# Patient Record
Sex: FEMALE | Race: WHITE
[De-identification: names, ages, dates, MRNs, and addresses within clinical notes are randomized per-mention and may not be internally consistent; named-entity substitution may affect disease eponyms.]

---

## 2017-01-09 ENCOUNTER — HOSPITAL ENCOUNTER (OUTPATIENT)
Dept: HOSPITAL 47 - RADMAMWWP | Age: 72
Discharge: HOME | End: 2017-01-09
Payer: MEDICARE

## 2017-01-09 DIAGNOSIS — Z12.31: Primary | ICD-10-CM

## 2017-01-09 PROCEDURE — 77063 BREAST TOMOSYNTHESIS BI: CPT

## 2017-01-09 PROCEDURE — 77052: CPT

## 2017-01-10 NOTE — MM
Reason for exam: screening  (asymptomatic).

Last mammogram was performed 1 year and 10 months ago.



History:

Patient is postmenopausal.

Benign US breast aspiration single LT of the left breast, March 16, 2015.

Took estrogen for 15 years.



Physical Findings:

A clinical breast exam by your physician is recommended on an annual basis and 

results should be correlated with mammographic findings.



MG 3D Screening Mammo W/Cad

Bilateral CC and MLO view(s) were taken.

Prior study comparison: March 16, 2015, left breast MG diagnostic mammo LT wo CAD.

December 17, 2014, bilateral MG diagnostic mammo w CAD SARAH.

The breast tissue is heterogeneously dense. This may lower the sensitivity of 

mammography.  No significant changes when compared with prior studies.





ASSESSMENT: Negative, BI-RAD 1



RECOMMENDATION:

Routine screening mammogram of both breasts in 1 year.

## 2019-10-15 ENCOUNTER — HOSPITAL ENCOUNTER (OUTPATIENT)
Dept: HOSPITAL 47 - RADCTMAIN | Age: 74
Discharge: HOME | End: 2019-10-15
Attending: ORTHOPAEDIC SURGERY
Payer: MEDICARE

## 2019-10-15 DIAGNOSIS — S52.571A: Primary | ICD-10-CM

## 2019-10-15 NOTE — CT
EXAMINATION TYPE: CT wrist RT wo con

 

DATE OF EXAM: 10/15/2019

 

COMPARISON: No outside images or priors are available for comparison.

 

HISTORY: Known displaced fracture of the right ulna

 

CT DLP: 263 mGycm

Automated exposure control for dose reduction was used. Standard unenhanced CT of the right wrist was
 performed in the axial plane with sagittal and coronal reconstructions. Three-D reconstructed images
 of the osseous structures with graded at a separate workstation for review.

 

FINDINGS: 

There is an apex volar angulated, comminuted, intra-articular, impacted fracture of the distal right 
radius with up to 5 mm foreshortening and up to 1.3 cm dorsal displacement of the distal fracture fra
gment. Intra-articular extension is seen into the radiocarpal joint at multiple locations given the c
omminution. There is extensive overlying soft tissue swelling and punctate foci of subcutaneous emphy
sema are also seen. There is a transversely oriented fracture of the ulnar styloid process with 1 mm 
of diastases of the fracture. There is no widening of the scapholunate joint. Carpal carpal interspac
es are maintained. No acute fracture seen of the carpal bones. No acute fracture of the proximal meta
carpals. Mild degenerative changes of the first metacarpal phalangeal joint displayed as joint space 
narrowing, opposing surface sclerosis and osseous proliferation. Evaluation of the ligaments and tend
ons are limited on CT and further limited given lack of intravenous contrast as well as further limit
ed given soft tissue swelling.

 

IMPRESSION: 

1. ACUTE, COMMINUTED, IMPACTED, APEX VOLAR ANGULATED, MULTIFOCAL INTRA-ARTICULAR FRACTURE OF THE DIST
AL RIGHT RADIUS WITH MINIMAL FORESHORTENING. PUNCTATE FOCI OF SUBCUTANEOUS EMPHYSEMA ARE ALSO SEEN CO
ULD BE INTRODUCED BY LACERATION, INTRAVENOUS INJECTION OR MUCH LESS LIKELY INFECTION.

2. ACUTE, TRANSVERSELY ORIENTED, MINIMALLY DIASTATIC FRACTURE OF THE ULNAR STYLOID PROCESS.

3. DIFFUSE SOFT TISSUE SWELLING OF THE RIGHT WRIST.

## 2019-10-22 ENCOUNTER — HOSPITAL ENCOUNTER (OUTPATIENT)
Dept: HOSPITAL 47 - LABPAT | Age: 74
Discharge: HOME | End: 2019-10-22
Attending: ORTHOPAEDIC SURGERY
Payer: MEDICARE

## 2019-10-22 DIAGNOSIS — Z01.818: Primary | ICD-10-CM

## 2019-10-22 PROCEDURE — 93005 ELECTROCARDIOGRAM TRACING: CPT

## 2019-10-23 ENCOUNTER — HOSPITAL ENCOUNTER (OUTPATIENT)
Dept: HOSPITAL 47 - OR | Age: 74
Discharge: HOME | End: 2019-10-23
Attending: ORTHOPAEDIC SURGERY
Payer: MEDICARE

## 2019-10-23 VITALS — TEMPERATURE: 98.8 F

## 2019-10-23 VITALS — SYSTOLIC BLOOD PRESSURE: 165 MMHG | HEART RATE: 75 BPM | DIASTOLIC BLOOD PRESSURE: 85 MMHG

## 2019-10-23 VITALS — RESPIRATION RATE: 16 BRPM

## 2019-10-23 VITALS — BODY MASS INDEX: 28.8 KG/M2

## 2019-10-23 DIAGNOSIS — S52.571A: Primary | ICD-10-CM

## 2019-10-23 DIAGNOSIS — Z79.899: ICD-10-CM

## 2019-10-23 DIAGNOSIS — Z79.84: ICD-10-CM

## 2019-10-23 DIAGNOSIS — I10: ICD-10-CM

## 2019-10-23 DIAGNOSIS — H91.90: ICD-10-CM

## 2019-10-23 DIAGNOSIS — Z87.891: ICD-10-CM

## 2019-10-23 DIAGNOSIS — Z97.3: ICD-10-CM

## 2019-10-23 DIAGNOSIS — Z97.2: ICD-10-CM
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
## 2019-10-23 DIAGNOSIS — Z88.5: ICD-10-CM

## 2019-10-23 DIAGNOSIS — Z88.8: ICD-10-CM

## 2019-10-23 DIAGNOSIS — M19.90: ICD-10-CM

## 2019-10-23 DIAGNOSIS — E11.9: ICD-10-CM

## 2019-10-23 DIAGNOSIS — Z88.1: ICD-10-CM

## 2019-10-23 DIAGNOSIS — K21.9: ICD-10-CM

## 2019-10-23 DIAGNOSIS — Z90.710: ICD-10-CM

## 2019-10-23 DIAGNOSIS — Z79.891: ICD-10-CM

## 2019-10-23 DIAGNOSIS — Z83.3: ICD-10-CM

## 2019-10-23 DIAGNOSIS — J45.909: ICD-10-CM

## 2019-10-23 DIAGNOSIS — Z82.49: ICD-10-CM

## 2019-10-23 DIAGNOSIS — G25.81: ICD-10-CM

## 2019-10-23 LAB
GLUCOSE BLD-MCNC: 113 MG/DL (ref 75–99)
GLUCOSE BLD-MCNC: 171 MG/DL (ref 75–99)

## 2019-10-23 PROCEDURE — 64413: CPT

## 2019-10-23 PROCEDURE — 73100 X-RAY EXAM OF WRIST: CPT

## 2019-10-23 PROCEDURE — 76942 ECHO GUIDE FOR BIOPSY: CPT

## 2019-10-23 PROCEDURE — 25609 OPTX DST RD XART FX/EP SEP3+: CPT

## 2019-10-23 PROCEDURE — 64415 NJX AA&/STRD BRCH PLXS IMG: CPT

## 2019-10-23 NOTE — P.ANPRN
Procedure Note - Anesthesia





- Nerve Block Performed


  ** Right Supraclavicular Single


Time Out Performed: Yes


Date of Procedure: 10/23/19


Procedure Start Time: 14:01


Procedure Stop Time: 14:08


Location of Patient Procedure: PreOp


Indication: Acute Post-Operative Pain, Requested by Surgeon


Sedation Type: Sedate with meaningful contact maintained


Preparation: Sterile Prep


Position: Supine


Needle Types: Pajunk


Needle Gauge: 21


Ultrasound used to visualize needle placement: Yes


Ultrasound used to observe medication spread: Yes


Blood Aspirated: No


Pain Paresthesia on Injection Noted: No


Resistance on Injection: Normal


Image Stored and Saved: Yes


Events: Uneventful and Well Tolerated (ropi .5% 25cc plus dexamethasone 4mg)

## 2019-10-24 NOTE — XR
Fluoroscopy

 

History: Fracture

 

Fracture. Hardware placement. RT wrist. 6 IM scanned. 70.4 sec FL.

## 2019-10-26 NOTE — P.OP
Date of Procedure: 10/23/19


Preoperative Diagnosis: 


Displaced, comminuted intra-articular right distal radius fracture





Postoperative Diagnosis: 


Displaced, comminuted intra-articular right distal radius fracture





Procedure(s) Performed: 


Open reduction and internal fixation of comminuted, intra-articular right distal

radius fracture (greater than 3 parts)





Implants: 


Arthrex dorsal spanning distal radius plate and cortical screws


Anesthesia: GETA, regional, local


Surgeon: Curt Gomez


Estimated Blood Loss (ml): 5


Condition: stable


Disposition: PACU


Indications for Procedure: 


The patient is a very pleasant 74-year-old female who sustained a complex, 

comminuted intra-articular right distal radius fracture after mechanical fall.  

A CT scan was performed which showed substantial comminution with very little 

subchondral bone to support direct fragment fixation. Treatment options (and 

associated risks and benefits) were discussed in the office. Surgical treatment 

was recommended, specifically in the form of bridge plating.  We discussed in 

detail the risk of of tendon injury, adhesions and loss of motion as well as the

need for a secondary surgery for hardware removal and possible lysis of 

adhesions.  She expressed understanding and willingness to accept these risks 

and wished to proceed with surgery.  In preop, the patient denied any additional

questions or concerns. Consent forms were signed. The operative site was 

confirmed and marked.





Description of Procedure: 


The patient was administered a regional nerve block by the anesthesia team and 

then brought to the operating suite. The patient was positioned supine with the 

operative limb on an arm board.  All bony prominences were well-padded.  General

anesthesia was administered uneventfully. Prophylactic IV antibiotics were 

administered. A tourniquet was placed on the right arm, which was then prepped 

and draped in standard, sterile fashion.  A timeout was performed, confirming 

patient identifiers, the operative side, the site and the procedure to be 

performed. All team members expressed agreement.





The fracture was initially evaluated with intraoperative fluoroscopy.  Multiple 

fracture fragments were again noted with fracture lines extending into the DRUJ 

and radiocarpal joints.  Manual reduction maneuver was performed utilizing 

combination of axial traction, ulnar deviation and palmar translation.  Good 

initial alignment was obtained.  The amount of subchondral bone still felt to be

inadequate to permit direct fragment fixation and the decision was made to 

proceed with dorsal spanning fixation.





The plate was positioned on the skin and used as a template to curt the proximal

and distal incision sites.  The limb was exsanguinated with an Esmarch and the 

tourniquet was inflated.  A longitudinal incision was made between the second 

and third metacarpal shafts.  Blunt spreading dissection was used.  Small 

superficial sensory branches were identified, mobilized and protected.  The 

extensor tendons were mobilized to expose the dorsal surface of the second 

metacarpal shaft.





A second longitudinal incision was made on the dorsal aspect of the distal 

forearm.  Spreading dissection was utilized.  A branch of the superficial radial

sensory nerve was identified, mobilized and protected.  The interval between the

brachioradialis and extensor carpi radialis longus tendon was developed to 

expose the radial shaft.  





A Fargo elevator was inserted through the distal wound and passed deep to the 

extensor tendons, creating a path for the plate.  The dorsal comminution and 

some residual extension deformity made passing this across the fracture site 

difficult.  The reduction maneuver was repeated.  With the fracture held 

reduced, a 0.062 K wire was inserted percutaneously into the radial styloid and 

driven across the fracture site.  Wire position and fracture reduction were 

confirmed on imaging.  The Fargo was again inserted and passed easily over the 

dorsal capsule and across the fracture site to the dorsal radial shaft.





The plate was inserted distally and passed in a similar fashion.  Passive 

flexion and extension of the thumb and fingers showed no tethering or 

restriction of tendon gliding.  The plate was clamped to the radius proximally. 

Distally, the plate was placed over the second metacarpal shaft.  Position was 

confirmed on imaging.  A cortical screw was drilled, measured and inserted to 

secure the plate to the metacarpal.  Some residual extension deformity remained.

 The percutaneous K wire was backed out and the fracture was re-reduced, 

improving both radial inclination and volar tilt.  The wire was readvanced for 

provisional fixation.





Plate position and fracture reduction were again confirmed on imaging.  Three 

cortical screws were drilled, measured and inserted to secure the plate to the 

radial shaft.  Two additional screws were drilled, measured and inserted to 

further secure the plate to the metacarpal. Final x-rays were obtained which 

revealed satisfactory reduction of the fracture. The wrist was then stressed 

under live fluoroscopy - the fixation construct showed excellent stability with 

no appreciable motion of the fracture fragments.





The tourniquet was released after 62 minutes at 250 mmHg. Good hemostasis was 

obtained with held pressure.  The wound was thoroughly irrigated with normal 

saline.  The periosteum was repaired over the plate proximally with interrupted 

Vicryl sutures. The subcutaneous tissues of the proximal incision were 

reapproximated with interrupted 3-0 Vicryl sutures. The incision was closed with

a running 4-0 nylon suture.  The incision in the hand was closed with a running 

5-0 nylon suture. Local anesthetic with epinephrine was injected into the 

perioperative subcutaneous tissues for adjunctive postoperative pain control and

hemostasis. A soft, sterile dressing was applied. All sponge, needle and instr

ument counts were correct at the end of the case. The patient tolerated the 

procedure well and was taken to the recovery room in stable condition.

## 2019-12-09 ENCOUNTER — HOSPITAL ENCOUNTER (OUTPATIENT)
Dept: HOSPITAL 47 - RADMAMWWP | Age: 74
Discharge: HOME | End: 2019-12-09
Attending: INTERNAL MEDICINE
Payer: MEDICARE

## 2019-12-09 DIAGNOSIS — Z12.31: Primary | ICD-10-CM

## 2019-12-09 PROCEDURE — 77063 BREAST TOMOSYNTHESIS BI: CPT

## 2019-12-09 PROCEDURE — 77067 SCR MAMMO BI INCL CAD: CPT

## 2019-12-10 NOTE — MM
Reason for exam: screening  (asymptomatic).

Last mammogram was performed 2 years and 11 months ago.



History:

Patient is postmenopausal.

Benign US breast aspiration single LT of the left breast, March 16, 2015.

Took estrogen for 15 years.



Physical Findings:

A clinical breast exam by your physician is recommended on an annual basis and 

results should be correlated with mammographic findings.



MG 3D Screening Mammo W/Cad

Bilateral CC and MLO view(s) were taken.

Prior study comparison: January 9, 2017, bilateral MG 3d screening mammo w/cad.  

March 16, 2015, left breast MG diagnostic mammo LT wo CAD.

The breast tissue is heterogeneously dense. This may lower the sensitivity of 

mammography.  Benign appearing bilateral calcifications. Previous mammotome biopsy

in the left breast.  No significant changes when compared with prior studies.





ASSESSMENT: Benign, BI-RAD 2



RECOMMENDATION:

Routine screening mammogram of both breasts in 1 year.

## 2019-12-12 ENCOUNTER — HOSPITAL ENCOUNTER (OUTPATIENT)
Dept: HOSPITAL 47 - RADCTMAIN | Age: 74
Discharge: HOME | End: 2019-12-12
Attending: ORTHOPAEDIC SURGERY
Payer: MEDICARE

## 2019-12-12 DIAGNOSIS — S52.571D: Primary | ICD-10-CM

## 2019-12-12 DIAGNOSIS — T81.30XA: ICD-10-CM

## 2019-12-12 DIAGNOSIS — Z48.89: ICD-10-CM

## 2019-12-12 DIAGNOSIS — Z98.890: ICD-10-CM

## 2019-12-12 DIAGNOSIS — S52.611D: ICD-10-CM

## 2019-12-12 NOTE — CT
EXAMINATION TYPE: CT wrist RT wo con

 

DATE OF EXAM: 12/12/2019

 

COMPARISON: Prior CT wrist 10/15/2019 

HISTORY: Rt wrist pain, post op complication

 

CT DLP: 185.1 mGycm

Automated exposure control for dose reduction was used. Helical imaging through the wrist, coronal an
d sagittal reconstructions.

 

FINDINGS: 

Comminuted distal radial fracture with displaced intra-articular fragments again noted. Nonunion of u
lnar styloid fracture which is also displaced. Underlying osteoarthritic changes are present. There i
s posterior fixation bracket in place, screws within the second metacarpal are not associated with th
e posterior bracket. The proximal screws within the radius are in place. Screw tracts are noted withi
n the second metacarpal. There is an overlying splint. There is soft tissue swelling present.

 

Impression: 

DISTAL SCREWS ARE NOT WITHIN THE POSTERIOR FIXATION BRACKET.

## 2019-12-24 ENCOUNTER — HOSPITAL ENCOUNTER (OUTPATIENT)
Dept: HOSPITAL 47 - OR | Age: 74
Discharge: HOME | End: 2019-12-24
Attending: ORTHOPAEDIC SURGERY
Payer: MEDICARE

## 2019-12-24 VITALS — SYSTOLIC BLOOD PRESSURE: 143 MMHG | DIASTOLIC BLOOD PRESSURE: 72 MMHG | HEART RATE: 92 BPM | RESPIRATION RATE: 16 BRPM

## 2019-12-24 VITALS — TEMPERATURE: 98 F

## 2019-12-24 VITALS — BODY MASS INDEX: 28.7 KG/M2

## 2019-12-24 DIAGNOSIS — H91.90: ICD-10-CM

## 2019-12-24 DIAGNOSIS — Z87.891: ICD-10-CM

## 2019-12-24 DIAGNOSIS — E11.9: ICD-10-CM

## 2019-12-24 DIAGNOSIS — Z88.1: ICD-10-CM

## 2019-12-24 DIAGNOSIS — S52.611D: ICD-10-CM

## 2019-12-24 DIAGNOSIS — Z88.8: ICD-10-CM

## 2019-12-24 DIAGNOSIS — G43.909: ICD-10-CM
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
## 2019-12-24 DIAGNOSIS — S56.511A: ICD-10-CM

## 2019-12-24 DIAGNOSIS — K21.9: ICD-10-CM

## 2019-12-24 DIAGNOSIS — I10: ICD-10-CM
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
## 2019-12-24 DIAGNOSIS — T84.428A: Primary | ICD-10-CM

## 2019-12-24 DIAGNOSIS — S52.571D: ICD-10-CM
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
## 2019-12-24 DIAGNOSIS — G25.81: ICD-10-CM

## 2019-12-24 DIAGNOSIS — Z86.011: ICD-10-CM

## 2019-12-24 DIAGNOSIS — Z88.5: ICD-10-CM

## 2019-12-24 DIAGNOSIS — Z79.2: ICD-10-CM

## 2019-12-24 DIAGNOSIS — T84.84XD: ICD-10-CM

## 2019-12-24 DIAGNOSIS — Z96.649: ICD-10-CM

## 2019-12-24 DIAGNOSIS — J45.909: ICD-10-CM

## 2019-12-24 DIAGNOSIS — Z79.891: ICD-10-CM

## 2019-12-24 DIAGNOSIS — Z79.899: ICD-10-CM

## 2019-12-24 DIAGNOSIS — M19.90: ICD-10-CM

## 2019-12-24 DIAGNOSIS — Z79.84: ICD-10-CM

## 2019-12-24 LAB
GLUCOSE BLD-MCNC: 110 MG/DL (ref 75–99)
GLUCOSE BLD-MCNC: 190 MG/DL (ref 75–99)

## 2019-12-24 PROCEDURE — 87070 CULTURE OTHR SPECIMN AEROBIC: CPT

## 2019-12-24 PROCEDURE — 87075 CULTR BACTERIA EXCEPT BLOOD: CPT

## 2019-12-24 PROCEDURE — 20680 REMOVAL OF IMPLANT DEEP: CPT

## 2019-12-24 PROCEDURE — 25270 REPAIR FOREARM TENDON/MUSCLE: CPT

## 2019-12-24 PROCEDURE — 73100 X-RAY EXAM OF WRIST: CPT

## 2019-12-24 PROCEDURE — 87205 SMEAR GRAM STAIN: CPT

## 2019-12-24 NOTE — FL
EXAMINATION TYPE: FL guidance operating room, XR wrist limited RT

 

DATE OF EXAM: 12/24/2019

 

CLINICAL HISTORY: Right wrist fracture.

 

TECHNIQUE: Fluoroscopy. Limited intraoperative views right wrist.

 

COMPARISON:  Recent CT December 12, 2019.

 

FINDINGS:  Fluoroscopic guidance was provided during hardware removal procedure performed by Dr. Priyank perez.  A total of 0.5 minute of fluoroscopic time was utilized during the procedure and 7 spot intr
aoperative images are provided which show interval removal of long segment fixating hardware.

 

IMPRESSION:  As Above.

## 2019-12-25 NOTE — P.OP
Date of Procedure: 12/24/19


Preoperative Diagnosis: 


1.  Hardware failure status post open reduction and internal fixation of 

comminuted right distal radius fracture with dorsal spanning plate





Postoperative Diagnosis: 


1.  Hardware failure status post open reduction and internal fixation of 

comminuted right distal radius fracture with dorsal spanning plate


2.  Partial attritional ruptures of the right extensor indicis proprius (EIP) 

and index finger extensor digitorum communis (EDC) tendons





Procedure(s) Performed: 


1.  Exam under anesthesia and manual application of joint stress for radiography

by physician  right wrist


2.  Removal of hardware - right hand and wrist


3.  Repair of right extensor indicis proprius (EIP) and index finger extensor 

digitorum communis (EDC) tendons - distal Zone 6


4.  Application of short arm splint by physician





Anesthesia: RICHIE, local


Surgeon: Ted Gomez


Estimated Blood Loss (ml): 5


Pathology: other (Wound culture swab)


Condition: stable


Disposition: PACU


Indications for Procedure: 


The patient is a very pleasant 74-year-old female who previously underwent 

dorsal spanning plate fixation for a comminuted right distal radius fracture.  

Follow-up x-rays in the office demonstrated failure of fixation: the distal 

screws holding the plate to the second metacarpal head completely pulled out. In

preop, the patient expressed that she thought the hardware failure was likely 

the result of overuse, stating: Its my own fault: I probably used it too 

much.


Treatment options (and associated risks and benefits) were discussed in the 

office. Surgical treatment was recommended. She expressed understanding, 

acceptance of the risks and wished to proceed with surgery. Consent forms were 

signed. The operative site was confirmed and marked.





Description of Procedure: 


The patient was brought to the operative suite and positioned supine with the 

operative limb on a hand table. Prophylactic antibiotics were administered. A 

tourniquet was applied to the right upper extremity, which was then prepped and 

draped in standard, sterile fashion. A time-out was performed, confirming 

patient identifiers, the operative side, site and procedure to be performed: all

team members expressed agreement. 





The wrist was examined with intraoperative fluoroscopy.  All three distal screws

were sitting loosen in the soft tissues.  The proximal screws were still fixed 

to the bone through the plate.  The wrist was taken through a passive range of 

motion under live fluoroscopy: No gross motion was identified at the fracture 

site.  The decision was made to proceed with hardware removal.  The limb was 

exsanguinated with an Esmarch and the tourniquet was inflated.





The previous incisions were utilized.  The skin was sharply incised along the 

dorsal forearm.  Spreading dissection was used to expose the dorsal radius, 

taking care to protect adjacent vessels and sensory branches.  The plate was 

identified by palpation.  The fibrous membrane covering the plate was incised to

expose the screw heads.  All three screws were still well-fixed and were removed

without difficulty.





The incision in the hand had a keratotic eschar along its distal third.  This 

was sharply excised in elliptical fashion, removing a 4 mm x 10 mm full-

thickness segment of skin.  The subcutaneous tissues were spread.  The index EDC

and EIP tendons were identified and mobilized.  The showed fraying and 

attritional ruptures from hardware irritation.  These were later repaired.





There was abundant thick, fibrous tissue over the second metacarpal, consistent 

with chronic irritation.  The metacarpal shaft was identified by palpation.  The

overlying tissue was incised to expose the shaft.  A small pocket of thin, gray 

fluid was encountered, likely reactive inflammatory transudate from the 

irritating hardware.  This did not appear infected but a culture swab was obtain

ed. All three distal screws were encased in thick fibrous tissue.  These were 

localized with fluoroscopy, dissected free and removed without difficulty.





An elevator was used to carefully release adhesions between the plate and the 

bone, proximally and distally.  The plate was removed through the proximal wound

without difficulty.  Curettes and rongeurs were used to resect fibrous scar 

tissue from the dorsal surface of the radius and second metacarpal.  Both wounds

were copiously irrigated with normal saline.  





Dynamic fluoroscopic examination was repeated after hardware removal.  Again, no

gross motion of the fracture fragments was appreciated.  However, subtle motion 

was noted at the volar rim when axial load and volar translation/flexion stress 

were applied.  The distal radius was deemed stable and sufficiently healed - no 

further fixation was deemed necessary. Attention was turned to the tendon 

ruptures.





The radial side of the extensor expansion was released to facilitate exposure.  

The radial side of EDC tendon showed a tear involving 30% of the tendon 

circumference with loss of tendon substance over a length of about 2 cm.  This 

was not felt to be repairable. The EIP tendon showed substantial partial-

thickness undersurface fraying.  Both tendons showed good overall strength: The 

index finger passively extended when traction was applied to each tendon using a

Ragnell retractor.





The frayed edges of each tendon were sharply resected.  A longitudinal split 

along the radial side of the EDC tendon was repaired in a side-to-side fashion, 

using multiple interrupted figure-of-eight sutures of 6-0 nylon.  The loose 

flaps on the undersurface of the EIP tendon were repaired with horizontal 

mattress sutures of 6-0 nylon.  This was reinforced along both sides with a 

running, locked 6-0 nylon stitch.  The split in the extensor expansion was 

repaired with 3-0 Vicryl suture.  Passive flexion and extension of the digit 

showed smooth gliding of both tendons without catching or loosening at the 

repair sites.





The tourniquet was released after 74 minutes at 250 mmHg. Hemostasis was 

obtained with manual pressure and electrocautery. The wounds were thoroughly 

irrigated with normal saline. The subcutaneous tissues on the proximal wound 

were reapproximated with interrupted 3-0 Vicryl sutures.  The incisions were 

closed with 4-0 nylon suture. Local anesthetic with epinephrine was injected for

adjunctive postoperative analgesia and hemostasis.  A soft, sterile dressing was

applied.  To protect both the healing fracture and tendon repairs, a short arm 

volar plaster splint was applied, with the wrist in 20 of extension and the MCP

and PIP joints free. All sponge, needle and instrument counts were correct at 

the end of the case. The patient tolerated the procedure well and was taken to 

the recovery room in stable condition.

## 2020-01-20 ENCOUNTER — HOSPITAL ENCOUNTER (OUTPATIENT)
Dept: HOSPITAL 47 - RADUSWWP | Age: 75
End: 2020-01-20
Attending: ORTHOPAEDIC SURGERY
Payer: MEDICARE

## 2020-01-20 DIAGNOSIS — M79.662: Primary | ICD-10-CM

## 2020-01-20 NOTE — US
EXAMINATION TYPE: US venous doppler duplex LE LT

 

DATE OF EXAM: 1/20/2020 12:39 PM

 

COMPARISON: NONE

 

CLINICAL HISTORY: I80.9 PHLEBITIS AND THROMBOPHLEBITIS OF SITE. Bump left lower lateral leg.

 

SIDE PERFORMED: Left  

 

TECHNIQUE:  The lower extremity deep venous system is examined utilizing real time linear array sonog
matty with graded compression, doppler sonography and color-flow sonography.

 

VESSELS IMAGED:

External Iliac Vein (EIV)

Common Femoral Vein

Deep Femoral Vein

Greater Saphenous Vein *

Femoral Vein

Popliteal Vein

Small Saphenous Vein *

Proximal Calf Veins

(* superficial vessels)

 

 

 

 

 

Left Leg:  Negative for DVT

 

Scanned directly over palpable, left lateral lower leg, no definite abnormality noted.

 

IMPRESSION: No evidence for DVT

## 2022-05-18 ENCOUNTER — HOSPITAL ENCOUNTER (OUTPATIENT)
Dept: HOSPITAL 47 - RADECHMAIN | Age: 77
Discharge: HOME | End: 2022-05-18
Attending: PSYCHIATRY & NEUROLOGY
Payer: MEDICARE

## 2022-05-18 DIAGNOSIS — Z53.9: Primary | ICD-10-CM
